# Patient Record
Sex: FEMALE | Race: WHITE
[De-identification: names, ages, dates, MRNs, and addresses within clinical notes are randomized per-mention and may not be internally consistent; named-entity substitution may affect disease eponyms.]

---

## 2019-04-09 ENCOUNTER — HOSPITAL ENCOUNTER (EMERGENCY)
Dept: HOSPITAL 72 - EMR | Age: 29
Discharge: HOME | End: 2019-04-09
Payer: MEDICAID

## 2019-04-09 VITALS — DIASTOLIC BLOOD PRESSURE: 89 MMHG | SYSTOLIC BLOOD PRESSURE: 115 MMHG

## 2019-04-09 VITALS — DIASTOLIC BLOOD PRESSURE: 90 MMHG | SYSTOLIC BLOOD PRESSURE: 115 MMHG

## 2019-04-09 VITALS — HEIGHT: 64 IN | WEIGHT: 140 LBS | BODY MASS INDEX: 23.9 KG/M2

## 2019-04-09 DIAGNOSIS — R07.89: Primary | ICD-10-CM

## 2019-04-09 DIAGNOSIS — F41.9: ICD-10-CM

## 2019-04-09 PROCEDURE — 93005 ELECTROCARDIOGRAM TRACING: CPT

## 2019-04-09 PROCEDURE — 99283 EMERGENCY DEPT VISIT LOW MDM: CPT

## 2019-04-09 NOTE — CARDIOLOGY REPORT
--------------- APPROVED REPORT --------------





EKG Measurement

Heart [id number]

TX [id number]

[id number]

[id number]

[id number]





Sinus tachycardia

Otherwise normal ECG

## 2019-04-09 NOTE — NUR
ED Nurse Note:



Pt cleared by health care Provider for discharge.  DC instructions/prescription 
was given and explained to pt and verbalized understanding of teachings. All 
medical deviecs such as ID band  removed. Pt is AAO x4, ambulatory and left 
with all personal belongings. Pt stated she would be going home

## 2019-04-09 NOTE — NUR
ED Nurse Note:



recieved pt biba from home with c/o pain to left arm which pt states is 
resolved, pt thinks she had muscle spasm in arm, pt has history of anxiety and 
does appear anxious and restless, pt can not sit still, pt denies cp or having 
arm pain now, pt is actually asking to go home, md at bedside, tp to have ekg 
done and prepare for d/c to home.

## 2019-04-09 NOTE — EMERGENCY ROOM REPORT
History of Present Illness


General


Chief Complaint:  General Complaint


Source:  Patient





Present Illness


HPI


28-year-old female presents ED for evaluation.  Brought in by EMS from street 

complaining of generalized body pain starting this morning.  Patient states 

that she has pain all over her body including her chest and left arm.  Woke up 

with this pain.  Dull, 7 out of 10, nonradiating.  Denies shortness of breath.  

Denies smoking or drug use.  States she does have history of anxiety and used 

to take Klonopin but does not take medication at this time.  Denies SI or HI.  

Denies hearing voices.  No other aggravating relieving factors.  Denies any 

other associated symptoms


Allergies:  


Coded Allergies:  


     No Known Allergies (Unverified , 4/9/19)





Patient History


Past Medical History:  psych hx


Past Surgical History:  none


Pertinent Family History:  none


Social History:  Denies: smoking, alcohol use, drug use


Pregnant Now:  No


Immunizations:  UTD


Reviewed Nursing Documentation:  PMH: Agreed; PSxH: Agreed





Nursing Documentation-PMH


Past Medical History:  No Stated History





Review of Systems


All Other Systems:  negative except mentioned in HPI





Physical Exam





Vital Signs








  Date Time  Temp Pulse Resp B/P (MAP) Pulse Ox O2 Delivery O2 Flow Rate FiO2


 


4/9/19 06:30 98.2 100 22 117/90 100 Room Air  








Sp02 EP Interpretation:  reviewed, normal


General Appearance:  no apparent distress, alert, GCS 15, non-toxic


Head:  normocephalic, atraumatic


Eyes:  bilateral eye normal inspection, bilateral eye PERRL


ENT:  hearing grossly normal, normal pharynx, no angioedema, normal voice


Neck:  full range of motion, supple/symm/no masses


Respiratory:  chest non-tender, lungs clear, normal breath sounds, speaking 

full sentences


Cardiovascular #1:  regular rate, rhythm, no edema


Cardiovascular #2:  2+ carotid (R), 2+ carotid (L), 2+ radial (R), 2+ radial (L)

, 2+ dorsalis pedis (R), 2+ dorsalis pedis (L)


Gastrointestinal:  normal bowel sounds, non tender, soft, non-distended, no 

guarding, no rebound


Rectal:  deferred


Genitourinary:  normal inspection, no CVA tenderness


Musculoskeletal:  back normal, gait/station normal, normal range of motion, non-

tender


Neurologic:  alert, oriented x3, responsive, motor strength/tone normal, 

sensory intact, speech normal


Psychiatric:  judgement/insight normal, memory normal, mood/affect normal, no 

suicidal/homicidal ideation


Reflexes:  3+ bicep (R), 3+ bicep (L), 3+ tricep (R), 3+ tricep (L), 3+ knee (R)

, 3+ knee (L)


Skin:  normal color, no rash, warm/dry, well hydrated


Lymphatic:  no adenopathy





Medical Decision Making


Diagnostic Impression:  


 Primary Impression:  


 Chest wall pain


 Additional Impression:  


 Anxiety


ER Course


Hospital Course 


29 yo F presents with generalized pain, feeling anxious 





Differential diagnoses include: MI/unstable angina, psychosis, anxiety





Clinical course


Patient placed on stretcher. on cardiac monitor. After initial history, 

physical exam reveals a young female in no acute distress.  Patient appears 

very restless, constantly moving.  Maintains good eye contact.  Answering 

questions appropriately.  No evidence of delusions.  No SI or HI.  Remainder 

physical exam unremarkable





I ordered EKG


EKGnormal sinus rhythm no acute ischemic changes interpreted by me





Vitals otherwise stable.  No cardiac risk factors. i believe symptoms are more 

likely anxiety related.  Safe for discharge or close outpatient follow-up





Patient found on the streets.  However states she does have a place to live.  

States she will arrange transportation home.





She does not have a PMD or psychiatrist.  I will provide referral.  Safe for 

discharge close outpatient follow-up





I.  I feel this is a highly complex case requiring extensive working including 

EKG/Rhythm strip, Xray/CT/US, Blood/urine lab work, repeat exams while in ED, 

and administration of strong opiates/narcotics for pain control, admission to 

hospital or close patient follow up.  





Diagnosis - anxiety, chest wall pain  





Stable and discharged to home.  Followup with PMD/psych.  Return to ED if 

symptoms recur or worse


EKG Diagnostic Results


Rate:  normal


Rhythm:  NSR


ST Segments:  no acute changes


ASA given to the pt in ED:  No





Rhythm Strip Diag. Results


EP Interpretation:  yes


Rhythm:  NSR, no PVC's, no ectopy





Last Vital Signs








  Date Time  Temp Pulse Resp B/P (MAP) Pulse Ox O2 Delivery O2 Flow Rate FiO2


 


4/9/19 08:00 98.2 75 22 115/90 100 Room Air  








Status:  improved


Disposition:  HOME, SELF-CARE


Condition:  Stable


Scripts


Acetaminophen* (TYLENOL EXTRA STRENGTH*) 500 Mg Tablet


500 MG ORAL Q8H PRN for Prn Headache/Temp > 101, #30 TAB 0 Refills


   Prov: Som Oates MD         4/9/19


Referrals:  


NOT CHOSEN IPA/MD,REFERRING











[facility]


Patient Instructions:  Panic Attacks, Easy-to-Read, Chest Wall Pain, Easy-to-

Read











Som Oates MD Apr 9, 2019 09:46